# Patient Record
Sex: MALE | Race: OTHER
[De-identification: names, ages, dates, MRNs, and addresses within clinical notes are randomized per-mention and may not be internally consistent; named-entity substitution may affect disease eponyms.]

---

## 2017-02-14 ENCOUNTER — HOSPITAL ENCOUNTER (OUTPATIENT)
Dept: HOSPITAL 15 - RAD HDHVI | Age: 77
Discharge: HOME | End: 2017-02-14
Attending: INTERNAL MEDICINE
Payer: MEDICARE

## 2017-02-14 DIAGNOSIS — I34.2: ICD-10-CM

## 2017-02-14 DIAGNOSIS — I34.0: ICD-10-CM

## 2017-02-14 DIAGNOSIS — I07.1: ICD-10-CM

## 2017-02-14 DIAGNOSIS — I50.9: Primary | ICD-10-CM

## 2017-02-14 DIAGNOSIS — I35.0: ICD-10-CM

## 2017-02-14 PROCEDURE — 93306 TTE W/DOPPLER COMPLETE: CPT

## 2017-02-22 ENCOUNTER — HOSPITAL ENCOUNTER (OUTPATIENT)
Dept: HOSPITAL 15 - RAD HDHVI | Age: 77
Discharge: HOME | End: 2017-02-22
Attending: INTERNAL MEDICINE
Payer: MEDICARE

## 2017-02-22 DIAGNOSIS — I11.0: Primary | ICD-10-CM

## 2017-02-22 DIAGNOSIS — I35.0: ICD-10-CM

## 2017-02-22 DIAGNOSIS — I50.9: ICD-10-CM

## 2017-02-22 DIAGNOSIS — R06.01: ICD-10-CM

## 2017-02-22 PROCEDURE — 93880 EXTRACRANIAL BILAT STUDY: CPT

## 2017-05-02 ENCOUNTER — HOSPITAL ENCOUNTER (OUTPATIENT)
Dept: HOSPITAL 15 - LAB | Age: 77
Discharge: HOME | End: 2017-05-02
Attending: INTERNAL MEDICINE
Payer: MEDICARE

## 2017-05-02 DIAGNOSIS — E11.9: ICD-10-CM

## 2017-05-02 DIAGNOSIS — I10: ICD-10-CM

## 2017-05-02 DIAGNOSIS — E78.00: Primary | ICD-10-CM

## 2017-05-02 DIAGNOSIS — R53.81: ICD-10-CM

## 2017-05-02 DIAGNOSIS — D64.9: ICD-10-CM

## 2017-05-02 DIAGNOSIS — R97.20: ICD-10-CM

## 2017-05-02 DIAGNOSIS — N39.0: ICD-10-CM

## 2017-05-02 DIAGNOSIS — K74.1: ICD-10-CM

## 2017-05-02 DIAGNOSIS — E55.9: ICD-10-CM

## 2017-05-02 DIAGNOSIS — E03.9: ICD-10-CM

## 2017-05-02 LAB
ALBUMIN SERPL-MCNC: 3.9 G/DL (ref 3.4–5)
ALP SERPL-CCNC: 104 U/L (ref 45–117)
ANION GAP SERPL CALCULATED.3IONS-SCNC: 10 MMOL/L (ref 5–15)
BILIRUB DIRECT SERPL-MCNC: 0.2 MG/DL (ref 0–0.2)
BILIRUB SERPL-MCNC: 0.7 MG/DL (ref 0.2–1)
BUN SERPL-MCNC: 16 MG/DL (ref 7–18)
BUN/CREAT SERPL: 14.3
CALCIUM SERPL-MCNC: 9.1 MG/DL (ref 8.5–10.1)
CHLORIDE SERPL-SCNC: 105 MMOL/L (ref 98–107)
CHOLEST SERPL-MCNC: 143 MG/DL (ref ?–200)
CO2 SERPL-SCNC: 25 MMOL/L (ref 21–32)
GLUCOSE SERPL-MCNC: 137 MG/DL (ref 74–106)
HCT VFR BLD AUTO: 45.6 % (ref 41–53)
HDLC SERPL-MCNC: 37 MG/DL (ref 40–59)
HGB BLD-MCNC: 15 G/DL (ref 13.5–17.5)
MCH RBC QN AUTO: 32.1 PG (ref 28–32)
MCV RBC AUTO: 97.3 FL (ref 80–100)
NRBC BLD QL AUTO: 0 %
POTASSIUM SERPL-SCNC: 4.1 MMOL/L (ref 3.5–5.1)
PROT SERPL-MCNC: 8.5 G/DL (ref 6.4–8.2)
SODIUM SERPL-SCNC: 140 MMOL/L (ref 136–145)
TRIGL SERPL-MCNC: 157 MG/DL (ref ?–150)

## 2017-05-02 PROCEDURE — 80048 BASIC METABOLIC PNL TOTAL CA: CPT

## 2017-05-02 PROCEDURE — 85025 COMPLETE CBC W/AUTO DIFF WBC: CPT

## 2017-05-02 PROCEDURE — 80061 LIPID PANEL: CPT

## 2017-05-02 PROCEDURE — 80076 HEPATIC FUNCTION PANEL: CPT

## 2017-05-02 PROCEDURE — 36415 COLL VENOUS BLD VENIPUNCTURE: CPT

## 2017-05-02 PROCEDURE — 84403 ASSAY OF TOTAL TESTOSTERONE: CPT

## 2017-05-02 PROCEDURE — 84153 ASSAY OF PSA TOTAL: CPT

## 2017-05-02 PROCEDURE — 82306 VITAMIN D 25 HYDROXY: CPT

## 2017-05-02 PROCEDURE — 83036 HEMOGLOBIN GLYCOSYLATED A1C: CPT

## 2017-05-02 PROCEDURE — 84443 ASSAY THYROID STIM HORMONE: CPT

## 2017-05-02 PROCEDURE — 81003 URINALYSIS AUTO W/O SCOPE: CPT

## 2017-05-08 ENCOUNTER — HOSPITAL ENCOUNTER (OUTPATIENT)
Dept: HOSPITAL 15 - RAD HDHVI | Age: 77
Discharge: HOME | End: 2017-05-08
Attending: INTERNAL MEDICINE
Payer: MEDICARE

## 2017-05-08 VITALS — SYSTOLIC BLOOD PRESSURE: 147 MMHG | DIASTOLIC BLOOD PRESSURE: 63 MMHG

## 2017-05-08 VITALS — SYSTOLIC BLOOD PRESSURE: 148 MMHG | DIASTOLIC BLOOD PRESSURE: 69 MMHG

## 2017-05-08 DIAGNOSIS — K44.9: ICD-10-CM

## 2017-05-08 DIAGNOSIS — K86.89: ICD-10-CM

## 2017-05-08 DIAGNOSIS — K42.9: ICD-10-CM

## 2017-05-08 DIAGNOSIS — I70.201: Primary | ICD-10-CM

## 2017-05-08 DIAGNOSIS — I71.4: ICD-10-CM

## 2017-05-08 DIAGNOSIS — K57.30: ICD-10-CM

## 2017-05-08 DIAGNOSIS — Z90.89: ICD-10-CM

## 2017-05-08 PROCEDURE — 96374 THER/PROPH/DIAG INJ IV PUSH: CPT

## 2017-05-08 PROCEDURE — 75635 CT ANGIO ABDOMINAL ARTERIES: CPT

## 2017-06-12 ENCOUNTER — HOSPITAL ENCOUNTER (OUTPATIENT)
Dept: HOSPITAL 15 - RAD HDHVI | Age: 77
Discharge: HOME | End: 2017-06-12
Attending: INTERNAL MEDICINE
Payer: MEDICARE

## 2017-06-12 VITALS — HEIGHT: 62 IN | WEIGHT: 169 LBS | BODY MASS INDEX: 31.1 KG/M2

## 2017-06-12 DIAGNOSIS — E11.9: ICD-10-CM

## 2017-06-12 DIAGNOSIS — N39.0: ICD-10-CM

## 2017-06-12 DIAGNOSIS — K74.1: ICD-10-CM

## 2017-06-12 DIAGNOSIS — E55.9: ICD-10-CM

## 2017-06-12 DIAGNOSIS — D64.9: ICD-10-CM

## 2017-06-12 DIAGNOSIS — E03.9: ICD-10-CM

## 2017-06-12 DIAGNOSIS — E78.00: ICD-10-CM

## 2017-06-12 DIAGNOSIS — R53.81: ICD-10-CM

## 2017-06-12 DIAGNOSIS — R97.20: ICD-10-CM

## 2017-06-12 DIAGNOSIS — I10: Primary | ICD-10-CM

## 2017-06-12 LAB
ALBUMIN SERPL-MCNC: 3.4 G/DL (ref 3.4–5)
ALP SERPL-CCNC: 98 U/L (ref 45–117)
ANION GAP SERPL CALCULATED.3IONS-SCNC: 8 MMOL/L (ref 5–15)
BILIRUB DIRECT SERPL-MCNC: 0.1 MG/DL (ref 0–0.2)
BILIRUB SERPL-MCNC: 0.6 MG/DL (ref 0.2–1)
BUN SERPL-MCNC: 16 MG/DL (ref 7–18)
BUN/CREAT SERPL: 14.2
CALCIUM SERPL-MCNC: 8.8 MG/DL (ref 8.5–10.1)
CHLORIDE SERPL-SCNC: 107 MMOL/L (ref 98–107)
CHOLEST SERPL-MCNC: 106 MG/DL (ref ?–200)
CO2 SERPL-SCNC: 25 MMOL/L (ref 21–32)
GLUCOSE SERPL-MCNC: 96 MG/DL (ref 74–106)
HCT VFR BLD AUTO: 41.3 % (ref 41–53)
HDLC SERPL-MCNC: 32 MG/DL (ref 40–59)
HEMOLYSIS - HIL: 1
HGB BLD-MCNC: 13.7 G/DL (ref 13.5–17.5)
ICTERIA - HIL: 1
LIPEMIA - HIL: 1
MCH RBC QN AUTO: 32.9 PG (ref 28–32)
MCV RBC AUTO: 99.4 FL (ref 80–100)
NRBC BLD QL AUTO: 0 %
POTASSIUM SERPL-SCNC: 4.1 MMOL/L (ref 3.5–5.1)
PROT SERPL-MCNC: 7.1 G/DL (ref 6.4–8.2)
SODIUM SERPL-SCNC: 140 MMOL/L (ref 136–145)
TRIGL SERPL-MCNC: 87 MG/DL (ref ?–150)

## 2017-06-12 PROCEDURE — 84403 ASSAY OF TOTAL TESTOSTERONE: CPT

## 2017-06-12 PROCEDURE — 84443 ASSAY THYROID STIM HORMONE: CPT

## 2017-06-12 PROCEDURE — 80061 LIPID PANEL: CPT

## 2017-06-12 PROCEDURE — 81003 URINALYSIS AUTO W/O SCOPE: CPT

## 2017-06-12 PROCEDURE — 82306 VITAMIN D 25 HYDROXY: CPT

## 2017-06-12 PROCEDURE — 78452 HT MUSCLE IMAGE SPECT MULT: CPT

## 2017-06-12 PROCEDURE — 80048 BASIC METABOLIC PNL TOTAL CA: CPT

## 2017-06-12 PROCEDURE — 96375 TX/PRO/DX INJ NEW DRUG ADDON: CPT

## 2017-06-12 PROCEDURE — 85025 COMPLETE CBC W/AUTO DIFF WBC: CPT

## 2017-06-12 PROCEDURE — 83036 HEMOGLOBIN GLYCOSYLATED A1C: CPT

## 2017-06-12 PROCEDURE — 93005 ELECTROCARDIOGRAM TRACING: CPT

## 2017-06-12 PROCEDURE — 36415 COLL VENOUS BLD VENIPUNCTURE: CPT

## 2017-06-12 PROCEDURE — 80076 HEPATIC FUNCTION PANEL: CPT

## 2017-06-12 PROCEDURE — 84153 ASSAY OF PSA TOTAL: CPT

## 2017-06-12 PROCEDURE — 96374 THER/PROPH/DIAG INJ IV PUSH: CPT

## 2017-07-17 ENCOUNTER — HOSPITAL ENCOUNTER (OUTPATIENT)
Dept: HOSPITAL 15 - LAB | Age: 77
Discharge: HOME | End: 2017-07-17
Attending: INTERNAL MEDICINE
Payer: MEDICARE

## 2017-07-17 DIAGNOSIS — N39.0: ICD-10-CM

## 2017-07-17 DIAGNOSIS — R07.0: ICD-10-CM

## 2017-07-17 DIAGNOSIS — I10: Primary | ICD-10-CM

## 2017-07-17 DIAGNOSIS — R79.82: ICD-10-CM

## 2017-07-17 DIAGNOSIS — D64.9: ICD-10-CM

## 2017-07-17 DIAGNOSIS — I25.10: ICD-10-CM

## 2017-07-17 LAB
ANION GAP SERPL CALCULATED.3IONS-SCNC: 7 MMOL/L (ref 5–15)
BUN SERPL-MCNC: 24 MG/DL (ref 7–18)
BUN/CREAT SERPL: 17
CALCIUM SERPL-MCNC: 8.9 MG/DL (ref 8.5–10.1)
CHLORIDE SERPL-SCNC: 105 MMOL/L (ref 98–107)
CO2 SERPL-SCNC: 26 MMOL/L (ref 21–32)
GLUCOSE SERPL-MCNC: 90 MG/DL (ref 74–106)
HCT VFR BLD AUTO: 42.5 % (ref 41–53)
HGB BLD-MCNC: 14.6 G/DL (ref 13.5–17.5)
MCH RBC QN AUTO: 34.1 PG (ref 28–32)
MCV RBC AUTO: 98.8 FL (ref 80–100)
NRBC BLD QL AUTO: 0 %
POTASSIUM SERPL-SCNC: 4.9 MMOL/L (ref 3.5–5.1)
SODIUM SERPL-SCNC: 138 MMOL/L (ref 136–145)

## 2017-07-17 PROCEDURE — 87070 CULTURE OTHR SPECIMN AEROBIC: CPT

## 2017-07-17 PROCEDURE — 80048 BASIC METABOLIC PNL TOTAL CA: CPT

## 2017-07-17 PROCEDURE — 36415 COLL VENOUS BLD VENIPUNCTURE: CPT

## 2017-07-17 PROCEDURE — 87086 URINE CULTURE/COLONY COUNT: CPT

## 2017-07-17 PROCEDURE — 86141 C-REACTIVE PROTEIN HS: CPT

## 2017-07-17 PROCEDURE — 85025 COMPLETE CBC W/AUTO DIFF WBC: CPT

## 2017-09-05 ENCOUNTER — HOSPITAL ENCOUNTER (OUTPATIENT)
Dept: HOSPITAL 15 - RAD HDHVI | Age: 77
Discharge: HOME | End: 2017-09-05
Attending: INTERNAL MEDICINE
Payer: MEDICARE

## 2017-09-05 DIAGNOSIS — I49.5: ICD-10-CM

## 2017-09-05 DIAGNOSIS — I10: Primary | ICD-10-CM

## 2017-09-05 LAB
ANION GAP SERPL CALCULATED.3IONS-SCNC: 9 MMOL/L (ref 5–15)
BUN SERPL-MCNC: 25 MG/DL (ref 7–18)
BUN/CREAT SERPL: 18.4
CALCIUM SERPL-MCNC: 8.7 MG/DL (ref 8.5–10.1)
CHLORIDE SERPL-SCNC: 108 MMOL/L (ref 98–107)
CO2 SERPL-SCNC: 22 MMOL/L (ref 21–32)
GLUCOSE SERPL-MCNC: 138 MG/DL (ref 74–106)
POTASSIUM SERPL-SCNC: 4.5 MMOL/L (ref 3.5–5.1)
SODIUM SERPL-SCNC: 139 MMOL/L (ref 136–145)

## 2017-09-05 PROCEDURE — 93880 EXTRACRANIAL BILAT STUDY: CPT

## 2017-09-05 PROCEDURE — 80048 BASIC METABOLIC PNL TOTAL CA: CPT

## 2017-09-05 PROCEDURE — 36415 COLL VENOUS BLD VENIPUNCTURE: CPT

## 2017-09-28 ENCOUNTER — HOSPITAL ENCOUNTER (OUTPATIENT)
Dept: HOSPITAL 15 - RAD HDHVI | Age: 77
End: 2017-09-28
Attending: INTERNAL MEDICINE
Payer: MEDICARE

## 2017-09-28 VITALS — SYSTOLIC BLOOD PRESSURE: 135 MMHG | DIASTOLIC BLOOD PRESSURE: 60 MMHG

## 2017-09-28 VITALS — SYSTOLIC BLOOD PRESSURE: 133 MMHG | DIASTOLIC BLOOD PRESSURE: 61 MMHG

## 2017-09-28 DIAGNOSIS — I71.4: Primary | ICD-10-CM

## 2017-09-28 PROCEDURE — 96374 THER/PROPH/DIAG INJ IV PUSH: CPT

## 2017-09-28 PROCEDURE — 82565 ASSAY OF CREATININE: CPT

## 2018-03-01 ENCOUNTER — HOSPITAL ENCOUNTER (OUTPATIENT)
Dept: HOSPITAL 15 - RAD HDHVI | Age: 78
Discharge: HOME | End: 2018-03-01
Attending: INTERNAL MEDICINE
Payer: MEDICARE

## 2018-03-01 DIAGNOSIS — D51.9: ICD-10-CM

## 2018-03-01 DIAGNOSIS — I25.10: Primary | ICD-10-CM

## 2018-03-01 DIAGNOSIS — I10: ICD-10-CM

## 2018-03-01 DIAGNOSIS — E78.5: ICD-10-CM

## 2018-03-01 DIAGNOSIS — I71.4: ICD-10-CM

## 2018-03-01 DIAGNOSIS — E78.00: ICD-10-CM

## 2018-03-01 DIAGNOSIS — N39.0: ICD-10-CM

## 2018-03-01 DIAGNOSIS — E11.9: ICD-10-CM

## 2018-03-01 DIAGNOSIS — E03.9: ICD-10-CM

## 2018-03-01 DIAGNOSIS — Z79.899: ICD-10-CM

## 2018-03-01 DIAGNOSIS — E55.9: ICD-10-CM

## 2018-03-01 DIAGNOSIS — R53.81: ICD-10-CM

## 2018-03-01 DIAGNOSIS — D64.9: ICD-10-CM

## 2018-03-01 DIAGNOSIS — R97.20: ICD-10-CM

## 2018-03-01 LAB
ALBUMIN SERPL-MCNC: 3.5 G/DL (ref 3.4–5)
ALP SERPL-CCNC: 85 U/L (ref 45–117)
ALT SERPL-CCNC: 21 U/L (ref 16–61)
ANION GAP SERPL CALCULATED.3IONS-SCNC: 10 MMOL/L (ref 5–15)
BILIRUB SERPL-MCNC: 0.6 MG/DL (ref 0.2–1)
BUN SERPL-MCNC: 19 MG/DL (ref 7–18)
BUN/CREAT SERPL: 16.1
CALCIUM SERPL-MCNC: 9.2 MG/DL (ref 8.5–10.1)
CHLORIDE SERPL-SCNC: 107 MMOL/L (ref 98–107)
CHOLEST SERPL-MCNC: 112 MG/DL (ref ?–200)
CO2 SERPL-SCNC: 23 MMOL/L (ref 21–32)
GLUCOSE SERPL-MCNC: 106 MG/DL (ref 74–106)
HCT VFR BLD AUTO: 42.5 % (ref 41–53)
HDLC SERPL-MCNC: 33 MG/DL (ref 40–59)
HGB BLD-MCNC: 14.1 G/DL (ref 13.5–17.5)
MCH RBC QN AUTO: 32.1 PG (ref 28–32)
MCV RBC AUTO: 96.6 FL (ref 80–100)
NRBC BLD QL AUTO: 0.8 %
POTASSIUM SERPL-SCNC: 4.3 MMOL/L (ref 3.5–5.1)
PROT SERPL-MCNC: 7.8 G/DL (ref 6.4–8.2)
SODIUM SERPL-SCNC: 140 MMOL/L (ref 136–145)
T4 FREE SERPL-MCNC: 1.01 NG/DL (ref 0.89–1.76)
TRIGL SERPL-MCNC: 95 MG/DL (ref ?–150)

## 2018-03-01 PROCEDURE — 78452 HT MUSCLE IMAGE SPECT MULT: CPT

## 2018-03-01 PROCEDURE — 80053 COMPREHEN METABOLIC PANEL: CPT

## 2018-03-01 PROCEDURE — 80061 LIPID PANEL: CPT

## 2018-03-01 PROCEDURE — 84439 ASSAY OF FREE THYROXINE: CPT

## 2018-03-01 PROCEDURE — 82306 VITAMIN D 25 HYDROXY: CPT

## 2018-03-01 PROCEDURE — 96374 THER/PROPH/DIAG INJ IV PUSH: CPT

## 2018-03-01 PROCEDURE — 83036 HEMOGLOBIN GLYCOSYLATED A1C: CPT

## 2018-03-01 PROCEDURE — 84403 ASSAY OF TOTAL TESTOSTERONE: CPT

## 2018-03-01 PROCEDURE — 84443 ASSAY THYROID STIM HORMONE: CPT

## 2018-03-01 PROCEDURE — 85025 COMPLETE CBC W/AUTO DIFF WBC: CPT

## 2018-03-01 PROCEDURE — 82607 VITAMIN B-12: CPT

## 2018-03-01 PROCEDURE — 93005 ELECTROCARDIOGRAM TRACING: CPT

## 2018-03-01 PROCEDURE — 96375 TX/PRO/DX INJ NEW DRUG ADDON: CPT

## 2018-03-01 PROCEDURE — 36415 COLL VENOUS BLD VENIPUNCTURE: CPT

## 2018-03-01 PROCEDURE — 81003 URINALYSIS AUTO W/O SCOPE: CPT

## 2018-03-05 ENCOUNTER — HOSPITAL ENCOUNTER (OUTPATIENT)
Dept: HOSPITAL 15 - RAD HDHVI | Age: 78
Discharge: HOME | End: 2018-03-05
Attending: INTERNAL MEDICINE
Payer: MEDICARE

## 2018-03-05 DIAGNOSIS — I25.10: ICD-10-CM

## 2018-03-05 DIAGNOSIS — I10: ICD-10-CM

## 2018-03-05 DIAGNOSIS — I08.0: Primary | ICD-10-CM

## 2018-03-05 PROCEDURE — 93306 TTE W/DOPPLER COMPLETE: CPT

## 2018-08-21 ENCOUNTER — HOSPITAL ENCOUNTER (OUTPATIENT)
Dept: HOSPITAL 15 - CHF HDHVI | Age: 78
Discharge: HOME | End: 2018-08-21
Attending: INTERNAL MEDICINE
Payer: MEDICARE

## 2018-08-21 VITALS — DIASTOLIC BLOOD PRESSURE: 63 MMHG | SYSTOLIC BLOOD PRESSURE: 120 MMHG

## 2018-08-21 VITALS — DIASTOLIC BLOOD PRESSURE: 63 MMHG | SYSTOLIC BLOOD PRESSURE: 115 MMHG

## 2018-08-21 DIAGNOSIS — I71.4: Primary | ICD-10-CM

## 2018-08-21 DIAGNOSIS — J44.9: ICD-10-CM

## 2018-08-21 LAB
ANION GAP SERPL CALCULATED.3IONS-SCNC: 9 MMOL/L (ref 5–15)
APTT PPP: 31.6 SEC (ref 23.78–33.04)
BUN SERPL-MCNC: 25 MG/DL (ref 7–18)
BUN/CREAT SERPL: 20.3
CALCIUM SERPL-MCNC: 8.6 MG/DL (ref 8.5–10.1)
CHLORIDE SERPL-SCNC: 105 MMOL/L (ref 98–107)
CO2 SERPL-SCNC: 24 MMOL/L (ref 21–32)
GLUCOSE SERPL-MCNC: 150 MG/DL (ref 74–106)
HCT VFR BLD AUTO: 40.3 % (ref 41–53)
HGB BLD-MCNC: 13.5 G/DL (ref 13.5–17.5)
INR PPP: 1.01 (ref 0.9–1.15)
MCH RBC QN AUTO: 32.6 PG (ref 28–32)
MCV RBC AUTO: 97.8 FL (ref 80–100)
NRBC BLD QL AUTO: 0 %
POTASSIUM SERPL-SCNC: 4.2 MMOL/L (ref 3.5–5.1)
PROTHROMBIN TIME: 10.8 SEC (ref 9.27–12.13)
SODIUM SERPL-SCNC: 138 MMOL/L (ref 136–145)

## 2018-08-21 PROCEDURE — 80048 BASIC METABOLIC PNL TOTAL CA: CPT

## 2018-08-21 PROCEDURE — 85610 PROTHROMBIN TIME: CPT

## 2018-08-21 PROCEDURE — 86901 BLOOD TYPING SEROLOGIC RH(D): CPT

## 2018-08-21 PROCEDURE — 36415 COLL VENOUS BLD VENIPUNCTURE: CPT

## 2018-08-21 PROCEDURE — 93005 ELECTROCARDIOGRAM TRACING: CPT

## 2018-08-21 PROCEDURE — 86850 RBC ANTIBODY SCREEN: CPT

## 2018-08-21 PROCEDURE — 85025 COMPLETE CBC W/AUTO DIFF WBC: CPT

## 2018-08-21 PROCEDURE — 86900 BLOOD TYPING SEROLOGIC ABO: CPT

## 2018-08-21 PROCEDURE — 85730 THROMBOPLASTIN TIME PARTIAL: CPT

## 2018-08-24 ENCOUNTER — HOSPITAL ENCOUNTER (INPATIENT)
Dept: HOSPITAL 15 - CATH | Age: 78
LOS: 1 days | Discharge: HOME | DRG: 269 | End: 2018-08-25
Attending: INTERNAL MEDICINE | Admitting: INTERNAL MEDICINE
Payer: MEDICARE

## 2018-08-24 VITALS — SYSTOLIC BLOOD PRESSURE: 117 MMHG | DIASTOLIC BLOOD PRESSURE: 52 MMHG

## 2018-08-24 VITALS — SYSTOLIC BLOOD PRESSURE: 126 MMHG | DIASTOLIC BLOOD PRESSURE: 60 MMHG

## 2018-08-24 VITALS — DIASTOLIC BLOOD PRESSURE: 49 MMHG | SYSTOLIC BLOOD PRESSURE: 104 MMHG

## 2018-08-24 VITALS — HEIGHT: 67 IN | WEIGHT: 160.94 LBS | BODY MASS INDEX: 25.26 KG/M2

## 2018-08-24 VITALS — DIASTOLIC BLOOD PRESSURE: 40 MMHG | SYSTOLIC BLOOD PRESSURE: 114 MMHG

## 2018-08-24 VITALS — SYSTOLIC BLOOD PRESSURE: 128 MMHG | DIASTOLIC BLOOD PRESSURE: 53 MMHG

## 2018-08-24 VITALS — SYSTOLIC BLOOD PRESSURE: 129 MMHG | DIASTOLIC BLOOD PRESSURE: 54 MMHG

## 2018-08-24 VITALS — DIASTOLIC BLOOD PRESSURE: 48 MMHG | SYSTOLIC BLOOD PRESSURE: 103 MMHG

## 2018-08-24 VITALS — DIASTOLIC BLOOD PRESSURE: 46 MMHG | SYSTOLIC BLOOD PRESSURE: 110 MMHG

## 2018-08-24 VITALS — SYSTOLIC BLOOD PRESSURE: 107 MMHG | DIASTOLIC BLOOD PRESSURE: 42 MMHG

## 2018-08-24 VITALS — SYSTOLIC BLOOD PRESSURE: 94 MMHG | DIASTOLIC BLOOD PRESSURE: 46 MMHG

## 2018-08-24 VITALS — DIASTOLIC BLOOD PRESSURE: 46 MMHG | SYSTOLIC BLOOD PRESSURE: 95 MMHG

## 2018-08-24 VITALS — DIASTOLIC BLOOD PRESSURE: 58 MMHG | SYSTOLIC BLOOD PRESSURE: 129 MMHG

## 2018-08-24 VITALS — SYSTOLIC BLOOD PRESSURE: 102 MMHG | DIASTOLIC BLOOD PRESSURE: 38 MMHG

## 2018-08-24 VITALS — SYSTOLIC BLOOD PRESSURE: 114 MMHG | DIASTOLIC BLOOD PRESSURE: 49 MMHG

## 2018-08-24 VITALS — SYSTOLIC BLOOD PRESSURE: 115 MMHG | DIASTOLIC BLOOD PRESSURE: 49 MMHG

## 2018-08-24 VITALS — DIASTOLIC BLOOD PRESSURE: 51 MMHG | SYSTOLIC BLOOD PRESSURE: 102 MMHG

## 2018-08-24 VITALS — SYSTOLIC BLOOD PRESSURE: 99 MMHG | DIASTOLIC BLOOD PRESSURE: 45 MMHG

## 2018-08-24 VITALS — SYSTOLIC BLOOD PRESSURE: 113 MMHG | DIASTOLIC BLOOD PRESSURE: 50 MMHG

## 2018-08-24 DIAGNOSIS — I73.9: ICD-10-CM

## 2018-08-24 DIAGNOSIS — J44.9: ICD-10-CM

## 2018-08-24 DIAGNOSIS — I25.10: ICD-10-CM

## 2018-08-24 DIAGNOSIS — E78.5: ICD-10-CM

## 2018-08-24 DIAGNOSIS — I70.90: ICD-10-CM

## 2018-08-24 DIAGNOSIS — I10: ICD-10-CM

## 2018-08-24 DIAGNOSIS — Z87.891: ICD-10-CM

## 2018-08-24 DIAGNOSIS — I71.4: Primary | ICD-10-CM

## 2018-08-24 DIAGNOSIS — Z95.1: ICD-10-CM

## 2018-08-24 PROCEDURE — 94640 AIRWAY INHALATION TREATMENT: CPT

## 2018-08-24 PROCEDURE — 87081 CULTURE SCREEN ONLY: CPT

## 2018-08-24 PROCEDURE — 99152 MOD SED SAME PHYS/QHP 5/>YRS: CPT

## 2018-08-24 PROCEDURE — 04V03D6: ICD-10-PCS | Performed by: INTERNAL MEDICINE

## 2018-08-24 PROCEDURE — 04VD3DZ RESTRICTION OF LEFT COMMON ILIAC ARTERY WITH INTRALUMINAL DEVICE, PERCUTANEOUS APPROACH: ICD-10-PCS | Performed by: INTERNAL MEDICINE

## 2018-08-24 PROCEDURE — 86900 BLOOD TYPING SEROLOGIC ABO: CPT

## 2018-08-24 PROCEDURE — 86850 RBC ANTIBODY SCREEN: CPT

## 2018-08-24 PROCEDURE — 86901 BLOOD TYPING SEROLOGIC RH(D): CPT

## 2018-08-24 PROCEDURE — 36415 COLL VENOUS BLD VENIPUNCTURE: CPT

## 2018-08-24 PROCEDURE — 04VC3DZ RESTRICTION OF RIGHT COMMON ILIAC ARTERY WITH INTRALUMINAL DEVICE, PERCUTANEOUS APPROACH: ICD-10-PCS | Performed by: INTERNAL MEDICINE

## 2018-08-24 PROCEDURE — 86920 COMPATIBILITY TEST SPIN: CPT

## 2018-08-24 PROCEDURE — 85025 COMPLETE CBC W/AUTO DIFF WBC: CPT

## 2018-08-24 RX ADMIN — VITAMIN D, TAB 1000IU (100/BT) SCH UNIT: 25 TAB at 17:30

## 2018-08-24 RX ADMIN — SODIUM CHLORIDE SCH ML: 9 INJECTION INTRAMUSCULAR; INTRAVENOUS; SUBCUTANEOUS at 22:00

## 2018-08-24 RX ADMIN — CILOSTAZOL SCH MG: 100 TABLET ORAL at 22:00

## 2018-08-24 RX ADMIN — BENAZEPRIL HYDROCHLORIDE SCH MG: 10 TABLET, FILM COATED ORAL at 17:30

## 2018-08-25 VITALS — SYSTOLIC BLOOD PRESSURE: 110 MMHG | DIASTOLIC BLOOD PRESSURE: 42 MMHG

## 2018-08-25 VITALS — SYSTOLIC BLOOD PRESSURE: 96 MMHG | DIASTOLIC BLOOD PRESSURE: 38 MMHG

## 2018-08-25 VITALS — SYSTOLIC BLOOD PRESSURE: 94 MMHG | DIASTOLIC BLOOD PRESSURE: 36 MMHG

## 2018-08-25 VITALS — SYSTOLIC BLOOD PRESSURE: 89 MMHG | DIASTOLIC BLOOD PRESSURE: 34 MMHG

## 2018-08-25 VITALS — DIASTOLIC BLOOD PRESSURE: 42 MMHG | SYSTOLIC BLOOD PRESSURE: 101 MMHG

## 2018-08-25 VITALS — DIASTOLIC BLOOD PRESSURE: 34 MMHG | SYSTOLIC BLOOD PRESSURE: 91 MMHG

## 2018-08-25 VITALS — DIASTOLIC BLOOD PRESSURE: 40 MMHG | SYSTOLIC BLOOD PRESSURE: 97 MMHG

## 2018-08-25 VITALS — SYSTOLIC BLOOD PRESSURE: 93 MMHG | DIASTOLIC BLOOD PRESSURE: 38 MMHG

## 2018-08-25 VITALS — DIASTOLIC BLOOD PRESSURE: 40 MMHG | SYSTOLIC BLOOD PRESSURE: 96 MMHG

## 2018-08-25 VITALS — DIASTOLIC BLOOD PRESSURE: 33 MMHG | SYSTOLIC BLOOD PRESSURE: 98 MMHG

## 2018-08-25 VITALS — DIASTOLIC BLOOD PRESSURE: 38 MMHG | SYSTOLIC BLOOD PRESSURE: 102 MMHG

## 2018-08-25 VITALS — DIASTOLIC BLOOD PRESSURE: 44 MMHG | SYSTOLIC BLOOD PRESSURE: 99 MMHG

## 2018-08-25 VITALS — SYSTOLIC BLOOD PRESSURE: 97 MMHG | DIASTOLIC BLOOD PRESSURE: 38 MMHG

## 2018-08-25 VITALS — DIASTOLIC BLOOD PRESSURE: 40 MMHG | SYSTOLIC BLOOD PRESSURE: 100 MMHG

## 2018-08-25 VITALS — SYSTOLIC BLOOD PRESSURE: 96 MMHG | DIASTOLIC BLOOD PRESSURE: 36 MMHG

## 2018-08-25 VITALS — SYSTOLIC BLOOD PRESSURE: 110 MMHG | DIASTOLIC BLOOD PRESSURE: 38 MMHG

## 2018-08-25 VITALS — DIASTOLIC BLOOD PRESSURE: 43 MMHG | SYSTOLIC BLOOD PRESSURE: 107 MMHG

## 2018-08-25 VITALS — DIASTOLIC BLOOD PRESSURE: 39 MMHG | SYSTOLIC BLOOD PRESSURE: 107 MMHG

## 2018-08-25 VITALS — DIASTOLIC BLOOD PRESSURE: 39 MMHG | SYSTOLIC BLOOD PRESSURE: 98 MMHG

## 2018-08-25 VITALS — DIASTOLIC BLOOD PRESSURE: 45 MMHG | SYSTOLIC BLOOD PRESSURE: 98 MMHG

## 2018-08-25 VITALS — SYSTOLIC BLOOD PRESSURE: 97 MMHG | DIASTOLIC BLOOD PRESSURE: 34 MMHG

## 2018-08-25 VITALS — DIASTOLIC BLOOD PRESSURE: 36 MMHG | SYSTOLIC BLOOD PRESSURE: 98 MMHG

## 2018-08-25 VITALS — DIASTOLIC BLOOD PRESSURE: 56 MMHG | SYSTOLIC BLOOD PRESSURE: 127 MMHG

## 2018-08-25 VITALS — SYSTOLIC BLOOD PRESSURE: 100 MMHG | DIASTOLIC BLOOD PRESSURE: 33 MMHG

## 2018-08-25 VITALS — DIASTOLIC BLOOD PRESSURE: 45 MMHG | SYSTOLIC BLOOD PRESSURE: 95 MMHG

## 2018-08-25 VITALS — SYSTOLIC BLOOD PRESSURE: 114 MMHG | DIASTOLIC BLOOD PRESSURE: 53 MMHG

## 2018-08-25 LAB
HCT VFR BLD AUTO: 34.2 % (ref 41–53)
HGB BLD-MCNC: 11.5 G/DL (ref 13.5–17.5)
MCH RBC QN AUTO: 32.9 PG (ref 28–32)
MCV RBC AUTO: 98.2 FL (ref 80–100)
NRBC BLD QL AUTO: 0.1 %

## 2018-08-25 RX ADMIN — BUDESONIDE SCH MG: 0.5 SUSPENSION RESPIRATORY (INHALATION) at 00:41

## 2018-08-25 RX ADMIN — ALBUTEROL SULFATE SCH MG: 2.5 SOLUTION RESPIRATORY (INHALATION) at 06:05

## 2018-08-25 RX ADMIN — SODIUM CHLORIDE SCH ML: 9 INJECTION INTRAMUSCULAR; INTRAVENOUS; SUBCUTANEOUS at 14:28

## 2018-08-25 RX ADMIN — VITAMIN D, TAB 1000IU (100/BT) SCH UNIT: 25 TAB at 10:20

## 2018-08-25 RX ADMIN — ALBUTEROL SULFATE SCH MG: 2.5 SOLUTION RESPIRATORY (INHALATION) at 12:24

## 2018-08-25 RX ADMIN — BUDESONIDE SCH MG: 0.5 SUSPENSION RESPIRATORY (INHALATION) at 06:05

## 2018-08-25 RX ADMIN — SODIUM CHLORIDE SCH ML: 9 INJECTION INTRAMUSCULAR; INTRAVENOUS; SUBCUTANEOUS at 10:12

## 2018-08-25 RX ADMIN — BENAZEPRIL HYDROCHLORIDE SCH MG: 10 TABLET, FILM COATED ORAL at 10:00

## 2018-08-25 RX ADMIN — ALBUTEROL SULFATE SCH MG: 2.5 SOLUTION RESPIRATORY (INHALATION) at 00:41

## 2018-08-25 RX ADMIN — CILOSTAZOL SCH MG: 100 TABLET ORAL at 10:20

## 2018-12-04 ENCOUNTER — HOSPITAL ENCOUNTER (OUTPATIENT)
Dept: HOSPITAL 15 - RAD HDHVI | Age: 78
Discharge: HOME | End: 2018-12-04
Attending: INTERNAL MEDICINE
Payer: MEDICARE

## 2018-12-04 VITALS — SYSTOLIC BLOOD PRESSURE: 122 MMHG | DIASTOLIC BLOOD PRESSURE: 61 MMHG

## 2018-12-04 VITALS — SYSTOLIC BLOOD PRESSURE: 126 MMHG | DIASTOLIC BLOOD PRESSURE: 59 MMHG

## 2018-12-04 DIAGNOSIS — I70.208: ICD-10-CM

## 2018-12-04 DIAGNOSIS — I71.4: ICD-10-CM

## 2018-12-04 DIAGNOSIS — Z95.828: ICD-10-CM

## 2018-12-04 DIAGNOSIS — I77.4: Primary | ICD-10-CM

## 2018-12-04 DIAGNOSIS — R10.30: ICD-10-CM

## 2018-12-04 PROCEDURE — 71275 CT ANGIOGRAPHY CHEST: CPT

## 2018-12-04 PROCEDURE — 82565 ASSAY OF CREATININE: CPT

## 2019-01-03 ENCOUNTER — HOSPITAL ENCOUNTER (OUTPATIENT)
Dept: HOSPITAL 15 - LAB | Age: 79
Discharge: HOME | End: 2019-01-03
Attending: INTERNAL MEDICINE
Payer: MEDICARE

## 2019-01-03 DIAGNOSIS — E78.5: Primary | ICD-10-CM

## 2019-01-03 DIAGNOSIS — D64.9: ICD-10-CM

## 2019-01-03 DIAGNOSIS — E11.9: ICD-10-CM

## 2019-01-03 DIAGNOSIS — N39.0: ICD-10-CM

## 2019-01-03 DIAGNOSIS — E03.9: ICD-10-CM

## 2019-01-03 DIAGNOSIS — C61: ICD-10-CM

## 2019-01-03 DIAGNOSIS — D51.9: ICD-10-CM

## 2019-01-03 DIAGNOSIS — E55.9: ICD-10-CM

## 2019-01-03 LAB
ALBUMIN SERPL-MCNC: 3.5 G/DL (ref 3.4–5)
ALP SERPL-CCNC: 102 U/L (ref 45–117)
ALT SERPL-CCNC: 18 U/L (ref 16–61)
ANION GAP SERPL CALCULATED.3IONS-SCNC: 6 MMOL/L (ref 5–15)
BILIRUB SERPL-MCNC: 0.7 MG/DL (ref 0.2–1)
BUN SERPL-MCNC: 21 MG/DL (ref 7–18)
BUN/CREAT SERPL: 17.2
CALCIUM SERPL-MCNC: 8.7 MG/DL (ref 8.5–10.1)
CHLORIDE SERPL-SCNC: 107 MMOL/L (ref 98–107)
CHOLEST SERPL-MCNC: 124 MG/DL (ref ?–200)
CO2 SERPL-SCNC: 26 MMOL/L (ref 21–32)
GLUCOSE SERPL-MCNC: 106 MG/DL (ref 74–106)
HCT VFR BLD AUTO: 42.8 % (ref 41–53)
HDLC SERPL-MCNC: 33 MG/DL (ref 40–59)
HGB BLD-MCNC: 14.2 G/DL (ref 13.5–17.5)
MCH RBC QN AUTO: 32.9 PG (ref 28–32)
MCV RBC AUTO: 99.3 FL (ref 80–100)
NRBC BLD QL AUTO: 0.1 %
POTASSIUM SERPL-SCNC: 4.5 MMOL/L (ref 3.5–5.1)
PROT SERPL-MCNC: 8.1 G/DL (ref 6.4–8.2)
PSA SERPL-MCNC: 1.85 NG/ML (ref 0–4)
SODIUM SERPL-SCNC: 139 MMOL/L (ref 136–145)
T4 FREE SERPL-MCNC: 0.96 NG/DL (ref 0.89–1.76)
TRIGL SERPL-MCNC: 63 MG/DL (ref ?–150)

## 2019-01-03 PROCEDURE — 83036 HEMOGLOBIN GLYCOSYLATED A1C: CPT

## 2019-01-03 PROCEDURE — 82607 VITAMIN B-12: CPT

## 2019-01-03 PROCEDURE — 84403 ASSAY OF TOTAL TESTOSTERONE: CPT

## 2019-01-03 PROCEDURE — 84153 ASSAY OF PSA TOTAL: CPT

## 2019-01-03 PROCEDURE — 81003 URINALYSIS AUTO W/O SCOPE: CPT

## 2019-01-03 PROCEDURE — 85025 COMPLETE CBC W/AUTO DIFF WBC: CPT

## 2019-01-03 PROCEDURE — 80053 COMPREHEN METABOLIC PANEL: CPT

## 2019-01-03 PROCEDURE — 84443 ASSAY THYROID STIM HORMONE: CPT

## 2019-01-03 PROCEDURE — 80061 LIPID PANEL: CPT

## 2019-01-03 PROCEDURE — 82306 VITAMIN D 25 HYDROXY: CPT

## 2019-01-03 PROCEDURE — 36415 COLL VENOUS BLD VENIPUNCTURE: CPT

## 2019-01-03 PROCEDURE — 84439 ASSAY OF FREE THYROXINE: CPT

## 2019-01-04 ENCOUNTER — HOSPITAL ENCOUNTER (OUTPATIENT)
Dept: HOSPITAL 15 - RAD HDHVI | Age: 79
Discharge: HOME | End: 2019-01-04
Attending: INTERNAL MEDICINE
Payer: MEDICARE

## 2019-01-04 DIAGNOSIS — M19.011: ICD-10-CM

## 2019-01-04 DIAGNOSIS — M47.812: Primary | ICD-10-CM

## 2019-01-04 PROCEDURE — 72040 X-RAY EXAM NECK SPINE 2-3 VW: CPT

## 2019-01-04 PROCEDURE — 73030 X-RAY EXAM OF SHOULDER: CPT

## 2019-07-29 ENCOUNTER — HOSPITAL ENCOUNTER (OUTPATIENT)
Dept: HOSPITAL 15 - RAD HDHVI | Age: 79
Discharge: HOME | End: 2019-07-29
Attending: INTERNAL MEDICINE
Payer: MEDICARE

## 2019-07-29 DIAGNOSIS — R09.89: ICD-10-CM

## 2019-07-29 DIAGNOSIS — Z79.899: ICD-10-CM

## 2019-07-29 DIAGNOSIS — D51.9: ICD-10-CM

## 2019-07-29 DIAGNOSIS — E29.1: ICD-10-CM

## 2019-07-29 DIAGNOSIS — C61: ICD-10-CM

## 2019-07-29 DIAGNOSIS — E03.9: ICD-10-CM

## 2019-07-29 DIAGNOSIS — I10: ICD-10-CM

## 2019-07-29 DIAGNOSIS — N39.0: ICD-10-CM

## 2019-07-29 DIAGNOSIS — E55.9: ICD-10-CM

## 2019-07-29 DIAGNOSIS — I08.2: Primary | ICD-10-CM

## 2019-07-29 LAB
ALBUMIN SERPL-MCNC: 3.4 G/DL (ref 3.4–5)
ALP SERPL-CCNC: 91 U/L (ref 45–117)
ALT SERPL-CCNC: 14 U/L (ref 16–61)
ANION GAP SERPL CALCULATED.3IONS-SCNC: 9 MMOL/L (ref 5–15)
BILIRUB SERPL-MCNC: 0.7 MG/DL (ref 0.2–1)
BUN SERPL-MCNC: 24 MG/DL (ref 7–18)
BUN/CREAT SERPL: 19.4
CALCIUM SERPL-MCNC: 8.9 MG/DL (ref 8.5–10.1)
CHLORIDE SERPL-SCNC: 111 MMOL/L (ref 98–107)
CHOLEST SERPL-MCNC: 112 MG/DL (ref ?–200)
CO2 SERPL-SCNC: 23 MMOL/L (ref 21–32)
GLUCOSE SERPL-MCNC: 102 MG/DL (ref 74–106)
HCT VFR BLD AUTO: 41.6 % (ref 41–53)
HDLC SERPL-MCNC: 35 MG/DL (ref 40–59)
HGB BLD-MCNC: 13.9 G/DL (ref 13.5–17.5)
MCH RBC QN AUTO: 34 PG (ref 28–32)
MCV RBC AUTO: 102.1 FL (ref 80–100)
NRBC BLD QL AUTO: 0.2 %
POTASSIUM SERPL-SCNC: 4.6 MMOL/L (ref 3.5–5.1)
PROT SERPL-MCNC: 8.2 G/DL (ref 6.4–8.2)
PSA SERPL-MCNC: 1.44 NG/ML (ref 0–4)
SODIUM SERPL-SCNC: 143 MMOL/L (ref 136–145)
T4 FREE SERPL-MCNC: 0.8 NG/DL (ref 0.89–1.76)
TRIGL SERPL-MCNC: 64 MG/DL (ref ?–150)

## 2019-07-29 PROCEDURE — 82607 VITAMIN B-12: CPT

## 2019-07-29 PROCEDURE — 84439 ASSAY OF FREE THYROXINE: CPT

## 2019-07-29 PROCEDURE — 81003 URINALYSIS AUTO W/O SCOPE: CPT

## 2019-07-29 PROCEDURE — 82306 VITAMIN D 25 HYDROXY: CPT

## 2019-07-29 PROCEDURE — 84443 ASSAY THYROID STIM HORMONE: CPT

## 2019-07-29 PROCEDURE — 83036 HEMOGLOBIN GLYCOSYLATED A1C: CPT

## 2019-07-29 PROCEDURE — 80061 LIPID PANEL: CPT

## 2019-07-29 PROCEDURE — 84403 ASSAY OF TOTAL TESTOSTERONE: CPT

## 2019-07-29 PROCEDURE — 80053 COMPREHEN METABOLIC PANEL: CPT

## 2019-07-29 PROCEDURE — 36415 COLL VENOUS BLD VENIPUNCTURE: CPT

## 2019-07-29 PROCEDURE — 84153 ASSAY OF PSA TOTAL: CPT

## 2019-07-29 PROCEDURE — 85025 COMPLETE CBC W/AUTO DIFF WBC: CPT

## 2019-07-29 PROCEDURE — 93880 EXTRACRANIAL BILAT STUDY: CPT

## 2019-07-29 PROCEDURE — 93306 TTE W/DOPPLER COMPLETE: CPT

## 2019-11-12 ENCOUNTER — HOSPITAL ENCOUNTER (OUTPATIENT)
Dept: HOSPITAL 15 - RAD HDHVI | Age: 79
Discharge: HOME | End: 2019-11-12
Attending: INTERNAL MEDICINE
Payer: MEDICARE

## 2019-11-12 VITALS — SYSTOLIC BLOOD PRESSURE: 133 MMHG | DIASTOLIC BLOOD PRESSURE: 56 MMHG

## 2019-11-12 VITALS — SYSTOLIC BLOOD PRESSURE: 131 MMHG | DIASTOLIC BLOOD PRESSURE: 59 MMHG

## 2019-11-12 DIAGNOSIS — R05: ICD-10-CM

## 2019-11-12 DIAGNOSIS — R94.4: ICD-10-CM

## 2019-11-12 DIAGNOSIS — R91.1: Primary | ICD-10-CM

## 2019-11-12 PROCEDURE — 82565 ASSAY OF CREATININE: CPT

## 2019-11-12 PROCEDURE — 36415 COLL VENOUS BLD VENIPUNCTURE: CPT

## 2019-11-12 PROCEDURE — 87070 CULTURE OTHR SPECIMN AEROBIC: CPT

## 2019-11-12 PROCEDURE — 71260 CT THORAX DX C+: CPT

## 2019-11-12 PROCEDURE — 87205 SMEAR GRAM STAIN: CPT

## 2019-11-12 PROCEDURE — 87077 CULTURE AEROBIC IDENTIFY: CPT

## 2019-11-12 PROCEDURE — 87186 SC STD MICRODIL/AGAR DIL: CPT

## 2019-11-12 NOTE — NUR
[facility] Discharge Instructions

See e-MAR for any mediations given with this visit.  Patient education given on disease 
process. Patient verbalized understanding.  Previous labs reviewed.  Patient discharged in 
stable condition with after care instructions and follow up appointment.

NOTE

PATIENT EDUCATED TO DRINK PLENTY OF FLUIDS OVER THE NEXT 24 HRS, PATIENT VERBALIZED 
UNDERSTANDING.

## 2020-02-06 ENCOUNTER — HOSPITAL ENCOUNTER (OUTPATIENT)
Dept: HOSPITAL 15 - LAB | Age: 80
Discharge: HOME | End: 2020-02-06
Attending: INTERNAL MEDICINE
Payer: MEDICARE

## 2020-02-06 DIAGNOSIS — R94.4: Primary | ICD-10-CM

## 2020-02-06 PROCEDURE — 82565 ASSAY OF CREATININE: CPT

## 2020-02-06 PROCEDURE — 36415 COLL VENOUS BLD VENIPUNCTURE: CPT

## 2020-02-07 ENCOUNTER — HOSPITAL ENCOUNTER (OUTPATIENT)
Dept: HOSPITAL 15 - RAD HDHVI | Age: 80
Discharge: HOME | End: 2020-02-07
Attending: INTERNAL MEDICINE
Payer: MEDICARE

## 2020-02-07 VITALS — SYSTOLIC BLOOD PRESSURE: 127 MMHG | DIASTOLIC BLOOD PRESSURE: 46 MMHG

## 2020-02-07 VITALS — DIASTOLIC BLOOD PRESSURE: 54 MMHG | SYSTOLIC BLOOD PRESSURE: 123 MMHG

## 2020-02-07 DIAGNOSIS — Z95.1: ICD-10-CM

## 2020-02-07 DIAGNOSIS — J44.9: Primary | ICD-10-CM

## 2020-02-07 NOTE — NUR
IV removal

IV DC'd BY SILVERIO GOMEZ with sterile technique, catheter fully intact. Pressure dressing 
applied to site. Patient tolerated procedure well.

## 2020-02-07 NOTE — NUR
IV insertion

IV access obtained, via clean sterile technique by inserting [20] gauge catheter at [LEFT 
AC] after [1] attempt(s). IV secured properly. No trauma to site. Patient tolerated 
procedure well.

## 2020-02-07 NOTE — NUR
[facility] Discharge Instructions

See e-MAR for any mediations given with this visit.  Patient education given on disease 
process. Patient verbalized understanding.  Previous labs reviewed.  Patient discharged in 
stable condition with after care instructions and follow up appointment.



NOTES

PT TOLERATED PROCEDURE/TEST WELL. PT EDUCATED INCREASE ORAL HYDRATION OVER THE NEXT 24 
HOURS. PT VERBALIZED UNDERSTANDING.

## 2020-02-17 ENCOUNTER — HOSPITAL ENCOUNTER (OUTPATIENT)
Dept: HOSPITAL 15 - RAD HDHVI | Age: 80
Discharge: HOME | End: 2020-02-17
Attending: INTERNAL MEDICINE
Payer: MEDICARE

## 2020-02-17 DIAGNOSIS — I25.10: Primary | ICD-10-CM

## 2020-02-17 DIAGNOSIS — R09.89: ICD-10-CM

## 2020-02-17 DIAGNOSIS — I10: ICD-10-CM

## 2020-02-17 PROCEDURE — 93880 EXTRACRANIAL BILAT STUDY: CPT

## 2020-04-18 ENCOUNTER — HOSPITAL ENCOUNTER (INPATIENT)
Dept: HOSPITAL 15 - ER | Age: 80
LOS: 2 days | DRG: 871 | End: 2020-04-20
Attending: INTERNAL MEDICINE | Admitting: INTERNAL MEDICINE
Payer: MEDICARE

## 2020-04-18 VITALS — SYSTOLIC BLOOD PRESSURE: 97 MMHG | DIASTOLIC BLOOD PRESSURE: 41 MMHG

## 2020-04-18 VITALS — BODY MASS INDEX: 26.64 KG/M2 | WEIGHT: 169.76 LBS | HEIGHT: 67 IN

## 2020-04-18 VITALS — SYSTOLIC BLOOD PRESSURE: 81 MMHG | DIASTOLIC BLOOD PRESSURE: 37 MMHG

## 2020-04-18 VITALS — SYSTOLIC BLOOD PRESSURE: 67 MMHG | DIASTOLIC BLOOD PRESSURE: 33 MMHG

## 2020-04-18 VITALS — SYSTOLIC BLOOD PRESSURE: 73 MMHG | DIASTOLIC BLOOD PRESSURE: 36 MMHG

## 2020-04-18 VITALS — SYSTOLIC BLOOD PRESSURE: 89 MMHG | DIASTOLIC BLOOD PRESSURE: 44 MMHG

## 2020-04-18 VITALS — SYSTOLIC BLOOD PRESSURE: 91 MMHG | DIASTOLIC BLOOD PRESSURE: 43 MMHG

## 2020-04-18 VITALS — SYSTOLIC BLOOD PRESSURE: 95 MMHG | DIASTOLIC BLOOD PRESSURE: 71 MMHG

## 2020-04-18 VITALS — DIASTOLIC BLOOD PRESSURE: 41 MMHG | SYSTOLIC BLOOD PRESSURE: 97 MMHG

## 2020-04-18 VITALS — DIASTOLIC BLOOD PRESSURE: 37 MMHG | SYSTOLIC BLOOD PRESSURE: 115 MMHG

## 2020-04-18 VITALS — DIASTOLIC BLOOD PRESSURE: 39 MMHG | SYSTOLIC BLOOD PRESSURE: 80 MMHG

## 2020-04-18 VITALS — DIASTOLIC BLOOD PRESSURE: 52 MMHG | SYSTOLIC BLOOD PRESSURE: 108 MMHG

## 2020-04-18 VITALS — SYSTOLIC BLOOD PRESSURE: 128 MMHG | DIASTOLIC BLOOD PRESSURE: 56 MMHG

## 2020-04-18 VITALS — DIASTOLIC BLOOD PRESSURE: 35 MMHG | SYSTOLIC BLOOD PRESSURE: 81 MMHG

## 2020-04-18 VITALS — DIASTOLIC BLOOD PRESSURE: 30 MMHG | SYSTOLIC BLOOD PRESSURE: 66 MMHG

## 2020-04-18 VITALS — SYSTOLIC BLOOD PRESSURE: 90 MMHG | DIASTOLIC BLOOD PRESSURE: 37 MMHG

## 2020-04-18 VITALS — DIASTOLIC BLOOD PRESSURE: 51 MMHG | SYSTOLIC BLOOD PRESSURE: 97 MMHG

## 2020-04-18 VITALS — SYSTOLIC BLOOD PRESSURE: 81 MMHG | DIASTOLIC BLOOD PRESSURE: 35 MMHG

## 2020-04-18 VITALS — DIASTOLIC BLOOD PRESSURE: 41 MMHG | SYSTOLIC BLOOD PRESSURE: 95 MMHG

## 2020-04-18 VITALS — DIASTOLIC BLOOD PRESSURE: 42 MMHG | SYSTOLIC BLOOD PRESSURE: 84 MMHG

## 2020-04-18 DIAGNOSIS — Z80.41: ICD-10-CM

## 2020-04-18 DIAGNOSIS — Z83.511: ICD-10-CM

## 2020-04-18 DIAGNOSIS — Z81.8: ICD-10-CM

## 2020-04-18 DIAGNOSIS — D70.1: ICD-10-CM

## 2020-04-18 DIAGNOSIS — E83.42: ICD-10-CM
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
## 2020-04-18 DIAGNOSIS — N18.3: ICD-10-CM

## 2020-04-18 DIAGNOSIS — E87.2: ICD-10-CM

## 2020-04-18 DIAGNOSIS — Z80.3: ICD-10-CM

## 2020-04-18 DIAGNOSIS — Z84.89: ICD-10-CM

## 2020-04-18 DIAGNOSIS — Z87.891: ICD-10-CM

## 2020-04-18 DIAGNOSIS — Z81.1: ICD-10-CM

## 2020-04-18 DIAGNOSIS — I25.10: ICD-10-CM

## 2020-04-18 DIAGNOSIS — Z83.42: ICD-10-CM

## 2020-04-18 DIAGNOSIS — Z82.61: ICD-10-CM

## 2020-04-18 DIAGNOSIS — Z82.5: ICD-10-CM

## 2020-04-18 DIAGNOSIS — J44.0: ICD-10-CM

## 2020-04-18 DIAGNOSIS — Z83.49: ICD-10-CM

## 2020-04-18 DIAGNOSIS — T45.1X5A: ICD-10-CM

## 2020-04-18 DIAGNOSIS — R57.0: ICD-10-CM

## 2020-04-18 DIAGNOSIS — I12.9: ICD-10-CM

## 2020-04-18 DIAGNOSIS — E43: ICD-10-CM

## 2020-04-18 DIAGNOSIS — C34.12: ICD-10-CM

## 2020-04-18 DIAGNOSIS — Z80.42: ICD-10-CM

## 2020-04-18 DIAGNOSIS — J18.9: ICD-10-CM

## 2020-04-18 DIAGNOSIS — Z82.0: ICD-10-CM

## 2020-04-18 DIAGNOSIS — J96.90: ICD-10-CM

## 2020-04-18 DIAGNOSIS — A41.9: Primary | ICD-10-CM

## 2020-04-18 DIAGNOSIS — K72.90: ICD-10-CM

## 2020-04-18 DIAGNOSIS — Z83.3: ICD-10-CM

## 2020-04-18 DIAGNOSIS — R18.8: ICD-10-CM

## 2020-04-18 DIAGNOSIS — Z03.818: ICD-10-CM

## 2020-04-18 DIAGNOSIS — N40.0: ICD-10-CM

## 2020-04-18 DIAGNOSIS — D75.89: ICD-10-CM

## 2020-04-18 LAB
ALBUMIN SERPL-MCNC: 2.5 G/DL (ref 3.4–5)
ALP SERPL-CCNC: 50 U/L (ref 45–117)
ALT SERPL-CCNC: 29 U/L (ref 16–61)
ANION GAP SERPL CALCULATED.3IONS-SCNC: 7 MMOL/L (ref 5–15)
APTT PPP: 29.4 SEC (ref 23.64–32.05)
BILIRUB SERPL-MCNC: 1.5 MG/DL (ref 0.2–1)
BUN SERPL-MCNC: 34 MG/DL (ref 7–18)
BUN/CREAT SERPL: 19.3
CALCIUM SERPL-MCNC: 6.7 MG/DL (ref 8.5–10.1)
CHLORIDE SERPL-SCNC: 110 MMOL/L (ref 98–107)
CO2 SERPL-SCNC: 24 MMOL/L (ref 21–32)
GLUCOSE SERPL-MCNC: 118 MG/DL (ref 74–106)
HCT VFR BLD AUTO: 34.4 % (ref 41–53)
HGB BLD-MCNC: 11.5 G/DL (ref 13.5–17.5)
INR PPP: 1.26 (ref 0.9–1.15)
LACTATE PLASV-SCNC: 3.4 MMOL/L (ref 0.4–2)
MAGNESIUM SERPL-MCNC: 1.5 MG/DL (ref 1.6–2.6)
MCH RBC QN AUTO: 34.3 PG (ref 28–32)
MCV RBC AUTO: 102.5 FL (ref 80–100)
NRBC BLD QL AUTO: 1.6 %
POTASSIUM SERPL-SCNC: 4.1 MMOL/L (ref 3.5–5.1)
PROT SERPL-MCNC: 5.8 G/DL (ref 6.4–8.2)
SODIUM SERPL-SCNC: 141 MMOL/L (ref 136–145)

## 2020-04-18 PROCEDURE — 82805 BLOOD GASES W/O2 SATURATION: CPT

## 2020-04-18 PROCEDURE — 85730 THROMBOPLASTIN TIME PARTIAL: CPT

## 2020-04-18 PROCEDURE — 83880 ASSAY OF NATRIURETIC PEPTIDE: CPT

## 2020-04-18 PROCEDURE — 87186 SC STD MICRODIL/AGAR DIL: CPT

## 2020-04-18 PROCEDURE — 36600 WITHDRAWAL OF ARTERIAL BLOOD: CPT

## 2020-04-18 PROCEDURE — 84443 ASSAY THYROID STIM HORMONE: CPT

## 2020-04-18 PROCEDURE — 94640 AIRWAY INHALATION TREATMENT: CPT

## 2020-04-18 PROCEDURE — 93005 ELECTROCARDIOGRAM TRACING: CPT

## 2020-04-18 PROCEDURE — 87077 CULTURE AEROBIC IDENTIFY: CPT

## 2020-04-18 PROCEDURE — 85379 FIBRIN DEGRADATION QUANT: CPT

## 2020-04-18 PROCEDURE — 84484 ASSAY OF TROPONIN QUANT: CPT

## 2020-04-18 PROCEDURE — 81001 URINALYSIS AUTO W/SCOPE: CPT

## 2020-04-18 PROCEDURE — 83605 ASSAY OF LACTIC ACID: CPT

## 2020-04-18 PROCEDURE — 83735 ASSAY OF MAGNESIUM: CPT

## 2020-04-18 PROCEDURE — 80053 COMPREHEN METABOLIC PANEL: CPT

## 2020-04-18 PROCEDURE — 87880 STREP A ASSAY W/OPTIC: CPT

## 2020-04-18 PROCEDURE — 71045 X-RAY EXAM CHEST 1 VIEW: CPT

## 2020-04-18 PROCEDURE — 94002 VENT MGMT INPAT INIT DAY: CPT

## 2020-04-18 PROCEDURE — 87070 CULTURE OTHR SPECIMN AEROBIC: CPT

## 2020-04-18 PROCEDURE — 87081 CULTURE SCREEN ONLY: CPT

## 2020-04-18 PROCEDURE — 87040 BLOOD CULTURE FOR BACTERIA: CPT

## 2020-04-18 PROCEDURE — 85610 PROTHROMBIN TIME: CPT

## 2020-04-18 PROCEDURE — 85025 COMPLETE CBC W/AUTO DIFF WBC: CPT

## 2020-04-18 PROCEDURE — 80048 BASIC METABOLIC PNL TOTAL CA: CPT

## 2020-04-18 PROCEDURE — 99291 CRITICAL CARE FIRST HOUR: CPT

## 2020-04-18 PROCEDURE — 87804 INFLUENZA ASSAY W/OPTIC: CPT

## 2020-04-18 PROCEDURE — 87205 SMEAR GRAM STAIN: CPT

## 2020-04-18 PROCEDURE — 94660 CPAP INITIATION&MGMT: CPT

## 2020-04-18 PROCEDURE — 36415 COLL VENOUS BLD VENIPUNCTURE: CPT

## 2020-04-18 RX ADMIN — MAGNESIUM SULFATE IN DEXTROSE SCH MLS/HR: 10 INJECTION, SOLUTION INTRAVENOUS at 13:45

## 2020-04-18 RX ADMIN — ACETAMINOPHEN PRN MG: 650 SUPPOSITORY RECTAL at 22:02

## 2020-04-18 RX ADMIN — PANTOPRAZOLE SODIUM SCH MG: 40 INJECTION, POWDER, FOR SOLUTION INTRAVENOUS at 20:20

## 2020-04-18 RX ADMIN — MAGNESIUM SULFATE IN DEXTROSE SCH MLS/HR: 10 INJECTION, SOLUTION INTRAVENOUS at 14:45

## 2020-04-18 RX ADMIN — NOREPINEPHRINE BITARTRATE PRN MLS/HR: 1 INJECTION, SOLUTION, CONCENTRATE INTRAVENOUS at 17:23

## 2020-04-19 VITALS — DIASTOLIC BLOOD PRESSURE: 46 MMHG | SYSTOLIC BLOOD PRESSURE: 101 MMHG

## 2020-04-19 VITALS — DIASTOLIC BLOOD PRESSURE: 44 MMHG | SYSTOLIC BLOOD PRESSURE: 89 MMHG

## 2020-04-19 VITALS — DIASTOLIC BLOOD PRESSURE: 41 MMHG | SYSTOLIC BLOOD PRESSURE: 85 MMHG

## 2020-04-19 VITALS — SYSTOLIC BLOOD PRESSURE: 78 MMHG | DIASTOLIC BLOOD PRESSURE: 40 MMHG

## 2020-04-19 VITALS — SYSTOLIC BLOOD PRESSURE: 88 MMHG | DIASTOLIC BLOOD PRESSURE: 42 MMHG

## 2020-04-19 VITALS — DIASTOLIC BLOOD PRESSURE: 37 MMHG | SYSTOLIC BLOOD PRESSURE: 89 MMHG

## 2020-04-19 VITALS — DIASTOLIC BLOOD PRESSURE: 44 MMHG | SYSTOLIC BLOOD PRESSURE: 108 MMHG

## 2020-04-19 VITALS — DIASTOLIC BLOOD PRESSURE: 42 MMHG | SYSTOLIC BLOOD PRESSURE: 79 MMHG

## 2020-04-19 VITALS — DIASTOLIC BLOOD PRESSURE: 42 MMHG | SYSTOLIC BLOOD PRESSURE: 94 MMHG

## 2020-04-19 VITALS — SYSTOLIC BLOOD PRESSURE: 98 MMHG | DIASTOLIC BLOOD PRESSURE: 40 MMHG

## 2020-04-19 VITALS — DIASTOLIC BLOOD PRESSURE: 46 MMHG | SYSTOLIC BLOOD PRESSURE: 107 MMHG

## 2020-04-19 VITALS — DIASTOLIC BLOOD PRESSURE: 47 MMHG | SYSTOLIC BLOOD PRESSURE: 97 MMHG

## 2020-04-19 VITALS — SYSTOLIC BLOOD PRESSURE: 108 MMHG | DIASTOLIC BLOOD PRESSURE: 50 MMHG

## 2020-04-19 VITALS — SYSTOLIC BLOOD PRESSURE: 89 MMHG | DIASTOLIC BLOOD PRESSURE: 40 MMHG

## 2020-04-19 VITALS — SYSTOLIC BLOOD PRESSURE: 81 MMHG | DIASTOLIC BLOOD PRESSURE: 33 MMHG

## 2020-04-19 VITALS — DIASTOLIC BLOOD PRESSURE: 48 MMHG | SYSTOLIC BLOOD PRESSURE: 100 MMHG

## 2020-04-19 VITALS — SYSTOLIC BLOOD PRESSURE: 82 MMHG | DIASTOLIC BLOOD PRESSURE: 43 MMHG

## 2020-04-19 VITALS — DIASTOLIC BLOOD PRESSURE: 34 MMHG | SYSTOLIC BLOOD PRESSURE: 84 MMHG

## 2020-04-19 VITALS — SYSTOLIC BLOOD PRESSURE: 81 MMHG | DIASTOLIC BLOOD PRESSURE: 34 MMHG

## 2020-04-19 VITALS — DIASTOLIC BLOOD PRESSURE: 30 MMHG | SYSTOLIC BLOOD PRESSURE: 87 MMHG

## 2020-04-19 VITALS — SYSTOLIC BLOOD PRESSURE: 102 MMHG | DIASTOLIC BLOOD PRESSURE: 49 MMHG

## 2020-04-19 VITALS — DIASTOLIC BLOOD PRESSURE: 41 MMHG | SYSTOLIC BLOOD PRESSURE: 88 MMHG

## 2020-04-19 VITALS — DIASTOLIC BLOOD PRESSURE: 49 MMHG | SYSTOLIC BLOOD PRESSURE: 90 MMHG

## 2020-04-19 VITALS — DIASTOLIC BLOOD PRESSURE: 42 MMHG | SYSTOLIC BLOOD PRESSURE: 86 MMHG

## 2020-04-19 VITALS — DIASTOLIC BLOOD PRESSURE: 51 MMHG | SYSTOLIC BLOOD PRESSURE: 99 MMHG

## 2020-04-19 VITALS — DIASTOLIC BLOOD PRESSURE: 37 MMHG | SYSTOLIC BLOOD PRESSURE: 95 MMHG

## 2020-04-19 VITALS — SYSTOLIC BLOOD PRESSURE: 82 MMHG | DIASTOLIC BLOOD PRESSURE: 40 MMHG

## 2020-04-19 VITALS — DIASTOLIC BLOOD PRESSURE: 34 MMHG | SYSTOLIC BLOOD PRESSURE: 87 MMHG

## 2020-04-19 VITALS — DIASTOLIC BLOOD PRESSURE: 41 MMHG | SYSTOLIC BLOOD PRESSURE: 99 MMHG

## 2020-04-19 VITALS — DIASTOLIC BLOOD PRESSURE: 44 MMHG | SYSTOLIC BLOOD PRESSURE: 99 MMHG

## 2020-04-19 VITALS — DIASTOLIC BLOOD PRESSURE: 38 MMHG | SYSTOLIC BLOOD PRESSURE: 98 MMHG

## 2020-04-19 VITALS — SYSTOLIC BLOOD PRESSURE: 99 MMHG | DIASTOLIC BLOOD PRESSURE: 51 MMHG

## 2020-04-19 VITALS — SYSTOLIC BLOOD PRESSURE: 105 MMHG | DIASTOLIC BLOOD PRESSURE: 36 MMHG

## 2020-04-19 VITALS — SYSTOLIC BLOOD PRESSURE: 87 MMHG | DIASTOLIC BLOOD PRESSURE: 39 MMHG

## 2020-04-19 VITALS — SYSTOLIC BLOOD PRESSURE: 89 MMHG | DIASTOLIC BLOOD PRESSURE: 33 MMHG

## 2020-04-19 VITALS — SYSTOLIC BLOOD PRESSURE: 81 MMHG | DIASTOLIC BLOOD PRESSURE: 32 MMHG

## 2020-04-19 VITALS — SYSTOLIC BLOOD PRESSURE: 88 MMHG | DIASTOLIC BLOOD PRESSURE: 44 MMHG

## 2020-04-19 VITALS — DIASTOLIC BLOOD PRESSURE: 46 MMHG | SYSTOLIC BLOOD PRESSURE: 81 MMHG

## 2020-04-19 VITALS — DIASTOLIC BLOOD PRESSURE: 40 MMHG | SYSTOLIC BLOOD PRESSURE: 81 MMHG

## 2020-04-19 VITALS — DIASTOLIC BLOOD PRESSURE: 45 MMHG | SYSTOLIC BLOOD PRESSURE: 92 MMHG

## 2020-04-19 VITALS — DIASTOLIC BLOOD PRESSURE: 58 MMHG | SYSTOLIC BLOOD PRESSURE: 102 MMHG

## 2020-04-19 VITALS — SYSTOLIC BLOOD PRESSURE: 106 MMHG | DIASTOLIC BLOOD PRESSURE: 46 MMHG

## 2020-04-19 VITALS — DIASTOLIC BLOOD PRESSURE: 46 MMHG | SYSTOLIC BLOOD PRESSURE: 102 MMHG

## 2020-04-19 VITALS — DIASTOLIC BLOOD PRESSURE: 35 MMHG | SYSTOLIC BLOOD PRESSURE: 85 MMHG

## 2020-04-19 VITALS — SYSTOLIC BLOOD PRESSURE: 103 MMHG | DIASTOLIC BLOOD PRESSURE: 44 MMHG

## 2020-04-19 VITALS — SYSTOLIC BLOOD PRESSURE: 87 MMHG | DIASTOLIC BLOOD PRESSURE: 35 MMHG

## 2020-04-19 VITALS — DIASTOLIC BLOOD PRESSURE: 43 MMHG | SYSTOLIC BLOOD PRESSURE: 94 MMHG

## 2020-04-19 VITALS — DIASTOLIC BLOOD PRESSURE: 49 MMHG | SYSTOLIC BLOOD PRESSURE: 99 MMHG

## 2020-04-19 VITALS — DIASTOLIC BLOOD PRESSURE: 43 MMHG | SYSTOLIC BLOOD PRESSURE: 101 MMHG

## 2020-04-19 VITALS — DIASTOLIC BLOOD PRESSURE: 47 MMHG | SYSTOLIC BLOOD PRESSURE: 83 MMHG

## 2020-04-19 VITALS — SYSTOLIC BLOOD PRESSURE: 91 MMHG | DIASTOLIC BLOOD PRESSURE: 46 MMHG

## 2020-04-19 VITALS — DIASTOLIC BLOOD PRESSURE: 40 MMHG | SYSTOLIC BLOOD PRESSURE: 82 MMHG

## 2020-04-19 VITALS — DIASTOLIC BLOOD PRESSURE: 43 MMHG | SYSTOLIC BLOOD PRESSURE: 93 MMHG

## 2020-04-19 VITALS — SYSTOLIC BLOOD PRESSURE: 85 MMHG | DIASTOLIC BLOOD PRESSURE: 40 MMHG

## 2020-04-19 VITALS — SYSTOLIC BLOOD PRESSURE: 92 MMHG | DIASTOLIC BLOOD PRESSURE: 44 MMHG

## 2020-04-19 VITALS — SYSTOLIC BLOOD PRESSURE: 82 MMHG | DIASTOLIC BLOOD PRESSURE: 45 MMHG

## 2020-04-19 VITALS — DIASTOLIC BLOOD PRESSURE: 41 MMHG | SYSTOLIC BLOOD PRESSURE: 93 MMHG

## 2020-04-19 VITALS — DIASTOLIC BLOOD PRESSURE: 48 MMHG | SYSTOLIC BLOOD PRESSURE: 84 MMHG

## 2020-04-19 VITALS — DIASTOLIC BLOOD PRESSURE: 35 MMHG | SYSTOLIC BLOOD PRESSURE: 81 MMHG

## 2020-04-19 VITALS — SYSTOLIC BLOOD PRESSURE: 88 MMHG | DIASTOLIC BLOOD PRESSURE: 35 MMHG

## 2020-04-19 VITALS — SYSTOLIC BLOOD PRESSURE: 96 MMHG | DIASTOLIC BLOOD PRESSURE: 47 MMHG

## 2020-04-19 VITALS — DIASTOLIC BLOOD PRESSURE: 40 MMHG | SYSTOLIC BLOOD PRESSURE: 98 MMHG

## 2020-04-19 VITALS — SYSTOLIC BLOOD PRESSURE: 88 MMHG | DIASTOLIC BLOOD PRESSURE: 46 MMHG

## 2020-04-19 VITALS — DIASTOLIC BLOOD PRESSURE: 46 MMHG | SYSTOLIC BLOOD PRESSURE: 105 MMHG

## 2020-04-19 VITALS — SYSTOLIC BLOOD PRESSURE: 105 MMHG | DIASTOLIC BLOOD PRESSURE: 50 MMHG

## 2020-04-19 VITALS — DIASTOLIC BLOOD PRESSURE: 35 MMHG | SYSTOLIC BLOOD PRESSURE: 95 MMHG

## 2020-04-19 VITALS — SYSTOLIC BLOOD PRESSURE: 82 MMHG | DIASTOLIC BLOOD PRESSURE: 36 MMHG

## 2020-04-19 VITALS — DIASTOLIC BLOOD PRESSURE: 45 MMHG | SYSTOLIC BLOOD PRESSURE: 111 MMHG

## 2020-04-19 VITALS — SYSTOLIC BLOOD PRESSURE: 83 MMHG | DIASTOLIC BLOOD PRESSURE: 37 MMHG

## 2020-04-19 VITALS — DIASTOLIC BLOOD PRESSURE: 39 MMHG | SYSTOLIC BLOOD PRESSURE: 86 MMHG

## 2020-04-19 VITALS — SYSTOLIC BLOOD PRESSURE: 90 MMHG | DIASTOLIC BLOOD PRESSURE: 40 MMHG

## 2020-04-19 VITALS — SYSTOLIC BLOOD PRESSURE: 94 MMHG | DIASTOLIC BLOOD PRESSURE: 35 MMHG

## 2020-04-19 VITALS — SYSTOLIC BLOOD PRESSURE: 105 MMHG | DIASTOLIC BLOOD PRESSURE: 52 MMHG

## 2020-04-19 VITALS — SYSTOLIC BLOOD PRESSURE: 77 MMHG | DIASTOLIC BLOOD PRESSURE: 41 MMHG

## 2020-04-19 VITALS — SYSTOLIC BLOOD PRESSURE: 105 MMHG | DIASTOLIC BLOOD PRESSURE: 56 MMHG

## 2020-04-19 VITALS — DIASTOLIC BLOOD PRESSURE: 73 MMHG | SYSTOLIC BLOOD PRESSURE: 129 MMHG

## 2020-04-19 VITALS — SYSTOLIC BLOOD PRESSURE: 89 MMHG | DIASTOLIC BLOOD PRESSURE: 46 MMHG

## 2020-04-19 VITALS — DIASTOLIC BLOOD PRESSURE: 81 MMHG | SYSTOLIC BLOOD PRESSURE: 108 MMHG

## 2020-04-19 VITALS — SYSTOLIC BLOOD PRESSURE: 98 MMHG | DIASTOLIC BLOOD PRESSURE: 46 MMHG

## 2020-04-19 VITALS — DIASTOLIC BLOOD PRESSURE: 38 MMHG | SYSTOLIC BLOOD PRESSURE: 94 MMHG

## 2020-04-19 VITALS — SYSTOLIC BLOOD PRESSURE: 100 MMHG | DIASTOLIC BLOOD PRESSURE: 45 MMHG

## 2020-04-19 VITALS — SYSTOLIC BLOOD PRESSURE: 85 MMHG | DIASTOLIC BLOOD PRESSURE: 39 MMHG

## 2020-04-19 VITALS — DIASTOLIC BLOOD PRESSURE: 44 MMHG | SYSTOLIC BLOOD PRESSURE: 104 MMHG

## 2020-04-19 VITALS — SYSTOLIC BLOOD PRESSURE: 105 MMHG | DIASTOLIC BLOOD PRESSURE: 48 MMHG

## 2020-04-19 VITALS — DIASTOLIC BLOOD PRESSURE: 49 MMHG | SYSTOLIC BLOOD PRESSURE: 111 MMHG

## 2020-04-19 VITALS — DIASTOLIC BLOOD PRESSURE: 53 MMHG | SYSTOLIC BLOOD PRESSURE: 105 MMHG

## 2020-04-19 VITALS — SYSTOLIC BLOOD PRESSURE: 101 MMHG | DIASTOLIC BLOOD PRESSURE: 43 MMHG

## 2020-04-19 VITALS — SYSTOLIC BLOOD PRESSURE: 79 MMHG | DIASTOLIC BLOOD PRESSURE: 43 MMHG

## 2020-04-19 VITALS — SYSTOLIC BLOOD PRESSURE: 103 MMHG | DIASTOLIC BLOOD PRESSURE: 50 MMHG

## 2020-04-19 VITALS — SYSTOLIC BLOOD PRESSURE: 82 MMHG | DIASTOLIC BLOOD PRESSURE: 32 MMHG

## 2020-04-19 VITALS — DIASTOLIC BLOOD PRESSURE: 36 MMHG | SYSTOLIC BLOOD PRESSURE: 80 MMHG

## 2020-04-19 VITALS — DIASTOLIC BLOOD PRESSURE: 36 MMHG | SYSTOLIC BLOOD PRESSURE: 105 MMHG

## 2020-04-19 VITALS — SYSTOLIC BLOOD PRESSURE: 93 MMHG | DIASTOLIC BLOOD PRESSURE: 50 MMHG

## 2020-04-19 VITALS — SYSTOLIC BLOOD PRESSURE: 72 MMHG | DIASTOLIC BLOOD PRESSURE: 39 MMHG

## 2020-04-19 VITALS — SYSTOLIC BLOOD PRESSURE: 89 MMHG | DIASTOLIC BLOOD PRESSURE: 41 MMHG

## 2020-04-19 VITALS — DIASTOLIC BLOOD PRESSURE: 47 MMHG | SYSTOLIC BLOOD PRESSURE: 96 MMHG

## 2020-04-19 LAB
ANION GAP SERPL CALCULATED.3IONS-SCNC: 12 MMOL/L (ref 5–15)
BUN SERPL-MCNC: 44 MG/DL (ref 7–18)
BUN/CREAT SERPL: 19.6
CALCIUM SERPL-MCNC: 6.7 MG/DL (ref 8.5–10.1)
CHLORIDE SERPL-SCNC: 106 MMOL/L (ref 98–107)
CO2 SERPL-SCNC: 20 MMOL/L (ref 21–32)
GLUCOSE SERPL-MCNC: 136 MG/DL (ref 74–106)
HCT VFR BLD AUTO: 35.3 % (ref 41–53)
HGB BLD-MCNC: 12 G/DL (ref 13.5–17.5)
LACTATE PLASV-SCNC: 4.5 MMOL/L (ref 0.4–2)
MAGNESIUM SERPL-MCNC: 1.6 MG/DL (ref 1.6–2.6)
MCH RBC QN AUTO: 34.4 PG (ref 28–32)
MCV RBC AUTO: 101.4 FL (ref 80–100)
NRBC BLD QL AUTO: 4.3 %
POTASSIUM SERPL-SCNC: 4.2 MMOL/L (ref 3.5–5.1)
SODIUM SERPL-SCNC: 138 MMOL/L (ref 136–145)

## 2020-04-19 PROCEDURE — 0BH17EZ INSERTION OF ENDOTRACHEAL AIRWAY INTO TRACHEA, VIA NATURAL OR ARTIFICIAL OPENING: ICD-10-PCS | Performed by: INTERNAL MEDICINE

## 2020-04-19 PROCEDURE — 5A1935Z RESPIRATORY VENTILATION, LESS THAN 24 CONSECUTIVE HOURS: ICD-10-PCS | Performed by: INTERNAL MEDICINE

## 2020-04-19 PROCEDURE — 5A09357 ASSISTANCE WITH RESPIRATORY VENTILATION, LESS THAN 24 CONSECUTIVE HOURS, CONTINUOUS POSITIVE AIRWAY PRESSURE: ICD-10-PCS | Performed by: INTERNAL MEDICINE

## 2020-04-19 RX ADMIN — PANTOPRAZOLE SODIUM SCH MG: 40 INJECTION, POWDER, FOR SOLUTION INTRAVENOUS at 10:00

## 2020-04-19 RX ADMIN — ACETAMINOPHEN PRN MG: 650 SUPPOSITORY RECTAL at 05:22

## 2020-04-19 RX ADMIN — SODIUM CHLORIDE SCH MLS/HR: 0.9 INJECTION, SOLUTION INTRAVENOUS at 10:30

## 2020-04-19 RX ADMIN — NOREPINEPHRINE BITARTRATE PRN MLS/HR: 1 INJECTION, SOLUTION, CONCENTRATE INTRAVENOUS at 07:00

## 2020-04-19 RX ADMIN — PIPERACILLIN SODIUM AND TAZOBACTAM SODIUM SCH MLS/HR: .375; 3 INJECTION, POWDER, LYOPHILIZED, FOR SOLUTION INTRAVENOUS at 12:00

## 2020-04-19 RX ADMIN — LEVALBUTEROL SCH MG: 1.25 SOLUTION RESPIRATORY (INHALATION) at 22:09

## 2020-04-19 RX ADMIN — SODIUM CHLORIDE SCH MLS/HR: 0.9 INJECTION, SOLUTION INTRAVENOUS at 17:33

## 2020-04-19 RX ADMIN — PHENYLEPHRINE HYDROCHLORIDE SCH MLS/HR: 10 INJECTION INTRAVENOUS at 21:29

## 2020-04-19 RX ADMIN — PHENYLEPHRINE HYDROCHLORIDE SCH MLS/HR: 10 INJECTION INTRAVENOUS at 13:19

## 2020-04-19 RX ADMIN — PIPERACILLIN SODIUM AND TAZOBACTAM SODIUM SCH MLS/HR: .375; 3 INJECTION, POWDER, LYOPHILIZED, FOR SOLUTION INTRAVENOUS at 23:50

## 2020-04-19 RX ADMIN — LEVALBUTEROL SCH MG: 1.25 SOLUTION RESPIRATORY (INHALATION) at 14:20

## 2020-04-19 RX ADMIN — LEVALBUTEROL SCH MG: 1.25 SOLUTION RESPIRATORY (INHALATION) at 06:22

## 2020-04-19 RX ADMIN — PHENYLEPHRINE HYDROCHLORIDE SCH MLS/HR: 10 INJECTION INTRAVENOUS at 23:26

## 2020-04-19 RX ADMIN — PIPERACILLIN SODIUM AND TAZOBACTAM SODIUM SCH MLS/HR: .375; 3 INJECTION, POWDER, LYOPHILIZED, FOR SOLUTION INTRAVENOUS at 17:33

## 2020-04-19 RX ADMIN — NOREPINEPHRINE BITARTRATE PRN MLS/HR: 1 INJECTION, SOLUTION, CONCENTRATE INTRAVENOUS at 22:33

## 2020-04-20 VITALS — SYSTOLIC BLOOD PRESSURE: 89 MMHG | DIASTOLIC BLOOD PRESSURE: 43 MMHG

## 2020-04-20 VITALS — SYSTOLIC BLOOD PRESSURE: 92 MMHG | DIASTOLIC BLOOD PRESSURE: 42 MMHG

## 2020-04-20 VITALS — SYSTOLIC BLOOD PRESSURE: 58 MMHG | DIASTOLIC BLOOD PRESSURE: 13 MMHG

## 2020-04-20 VITALS — SYSTOLIC BLOOD PRESSURE: 88 MMHG | DIASTOLIC BLOOD PRESSURE: 42 MMHG

## 2020-04-20 VITALS — DIASTOLIC BLOOD PRESSURE: 29 MMHG | SYSTOLIC BLOOD PRESSURE: 78 MMHG

## 2020-04-20 VITALS — DIASTOLIC BLOOD PRESSURE: 40 MMHG | SYSTOLIC BLOOD PRESSURE: 97 MMHG

## 2020-04-20 VITALS — SYSTOLIC BLOOD PRESSURE: 69 MMHG | DIASTOLIC BLOOD PRESSURE: 23 MMHG

## 2020-04-20 VITALS — DIASTOLIC BLOOD PRESSURE: 42 MMHG | SYSTOLIC BLOOD PRESSURE: 88 MMHG

## 2020-04-20 VITALS — DIASTOLIC BLOOD PRESSURE: 33 MMHG | SYSTOLIC BLOOD PRESSURE: 69 MMHG

## 2020-04-20 VITALS — SYSTOLIC BLOOD PRESSURE: 85 MMHG | DIASTOLIC BLOOD PRESSURE: 42 MMHG

## 2020-04-20 VITALS — DIASTOLIC BLOOD PRESSURE: 18 MMHG | SYSTOLIC BLOOD PRESSURE: 50 MMHG

## 2020-04-20 VITALS — SYSTOLIC BLOOD PRESSURE: 77 MMHG | DIASTOLIC BLOOD PRESSURE: 32 MMHG

## 2020-04-20 VITALS — SYSTOLIC BLOOD PRESSURE: 89 MMHG | DIASTOLIC BLOOD PRESSURE: 33 MMHG

## 2020-04-20 VITALS — SYSTOLIC BLOOD PRESSURE: 100 MMHG | DIASTOLIC BLOOD PRESSURE: 49 MMHG

## 2020-04-20 VITALS — DIASTOLIC BLOOD PRESSURE: 43 MMHG | SYSTOLIC BLOOD PRESSURE: 89 MMHG

## 2020-04-20 VITALS — DIASTOLIC BLOOD PRESSURE: 40 MMHG | SYSTOLIC BLOOD PRESSURE: 89 MMHG

## 2020-04-20 VITALS — DIASTOLIC BLOOD PRESSURE: 34 MMHG | SYSTOLIC BLOOD PRESSURE: 77 MMHG

## 2020-04-20 VITALS — DIASTOLIC BLOOD PRESSURE: 39 MMHG | SYSTOLIC BLOOD PRESSURE: 90 MMHG

## 2020-04-20 VITALS — DIASTOLIC BLOOD PRESSURE: 23 MMHG | SYSTOLIC BLOOD PRESSURE: 83 MMHG

## 2020-04-20 VITALS — DIASTOLIC BLOOD PRESSURE: 33 MMHG | SYSTOLIC BLOOD PRESSURE: 75 MMHG

## 2020-04-20 VITALS — DIASTOLIC BLOOD PRESSURE: 26 MMHG | SYSTOLIC BLOOD PRESSURE: 73 MMHG

## 2020-04-20 VITALS — SYSTOLIC BLOOD PRESSURE: 71 MMHG | DIASTOLIC BLOOD PRESSURE: 30 MMHG

## 2020-04-20 VITALS — DIASTOLIC BLOOD PRESSURE: 45 MMHG | SYSTOLIC BLOOD PRESSURE: 105 MMHG

## 2020-04-20 VITALS — DIASTOLIC BLOOD PRESSURE: 35 MMHG | SYSTOLIC BLOOD PRESSURE: 91 MMHG

## 2020-04-20 VITALS — SYSTOLIC BLOOD PRESSURE: 84 MMHG | DIASTOLIC BLOOD PRESSURE: 43 MMHG

## 2020-04-20 VITALS — DIASTOLIC BLOOD PRESSURE: 41 MMHG | SYSTOLIC BLOOD PRESSURE: 89 MMHG

## 2020-04-20 VITALS — DIASTOLIC BLOOD PRESSURE: 36 MMHG | SYSTOLIC BLOOD PRESSURE: 76 MMHG

## 2020-04-20 VITALS — DIASTOLIC BLOOD PRESSURE: 43 MMHG | SYSTOLIC BLOOD PRESSURE: 79 MMHG

## 2020-04-20 VITALS — DIASTOLIC BLOOD PRESSURE: 31 MMHG | SYSTOLIC BLOOD PRESSURE: 95 MMHG

## 2020-04-20 VITALS — DIASTOLIC BLOOD PRESSURE: 37 MMHG | SYSTOLIC BLOOD PRESSURE: 87 MMHG

## 2020-04-20 VITALS — SYSTOLIC BLOOD PRESSURE: 88 MMHG | DIASTOLIC BLOOD PRESSURE: 35 MMHG

## 2020-04-20 VITALS — SYSTOLIC BLOOD PRESSURE: 84 MMHG | DIASTOLIC BLOOD PRESSURE: 39 MMHG

## 2020-04-20 VITALS — DIASTOLIC BLOOD PRESSURE: 35 MMHG | SYSTOLIC BLOOD PRESSURE: 89 MMHG

## 2020-04-20 VITALS — SYSTOLIC BLOOD PRESSURE: 91 MMHG | DIASTOLIC BLOOD PRESSURE: 34 MMHG

## 2020-04-20 VITALS — SYSTOLIC BLOOD PRESSURE: 81 MMHG | DIASTOLIC BLOOD PRESSURE: 41 MMHG

## 2020-04-20 VITALS — SYSTOLIC BLOOD PRESSURE: 87 MMHG | DIASTOLIC BLOOD PRESSURE: 41 MMHG

## 2020-04-20 VITALS — DIASTOLIC BLOOD PRESSURE: 31 MMHG | SYSTOLIC BLOOD PRESSURE: 72 MMHG

## 2020-04-20 PROCEDURE — 5A12012 PERFORMANCE OF CARDIAC OUTPUT, SINGLE, MANUAL: ICD-10-PCS | Performed by: INTERNAL MEDICINE

## 2020-04-20 PROCEDURE — 02HV33Z INSERTION OF INFUSION DEVICE INTO SUPERIOR VENA CAVA, PERCUTANEOUS APPROACH: ICD-10-PCS | Performed by: INTERNAL MEDICINE

## 2020-04-20 RX ADMIN — PHENYLEPHRINE HYDROCHLORIDE SCH MLS/HR: 10 INJECTION INTRAVENOUS at 01:19

## 2020-04-20 RX ADMIN — SODIUM CHLORIDE SCH MLS/HR: 0.9 INJECTION, SOLUTION INTRAVENOUS at 03:50
